# Patient Record
Sex: MALE | Race: ASIAN | Employment: STUDENT | URBAN - METROPOLITAN AREA
[De-identification: names, ages, dates, MRNs, and addresses within clinical notes are randomized per-mention and may not be internally consistent; named-entity substitution may affect disease eponyms.]

---
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
## 2019-04-30 PROBLEM — F39 MODERATE MOOD DISORDER (HCC): Status: ACTIVE | Noted: 2019-04-30

## 2019-04-30 PROBLEM — R63.6 UNDERWEIGHT IN ADOLESCENCE: Status: ACTIVE | Noted: 2018-08-31

## 2019-04-30 PROBLEM — F32.A DEPRESSION: Status: ACTIVE | Noted: 2019-04-30

## 2019-04-30 PROCEDURE — 80053 COMPREHEN METABOLIC PANEL: CPT | Performed by: FAMILY MEDICINE

## 2019-04-30 PROCEDURE — 84443 ASSAY THYROID STIM HORMONE: CPT | Performed by: FAMILY MEDICINE

## 2019-04-30 PROCEDURE — 85027 COMPLETE CBC AUTOMATED: CPT | Performed by: FAMILY MEDICINE

## 2019-04-30 NOTE — PROGRESS NOTES
CC:  Follow - Up (Pt presents to clinic for f/up and other issues-->parents did not specify. )      Hx of CC:  ACCOMPANIED BY BOTH PARENTS FOR VISIT. PATIENT C/O DECREASED APPETITE/ENERGY/SADNESS X MONTHS.   RESTARTED ON SERTRALINE 2 WEEKS AGO--BEING SEEN Future  - TSH W REFLEX TO FREE T4  - CBC, PLATELET; NO DIFFERENTIAL    None  Orders Placed This Encounter      Comp Metabolic Panel (14) [E]      CBC, Platelet, No Differential [E]          Standing Status: Future          Number of Occurrences: 1

## 2019-04-30 NOTE — PATIENT INSTRUCTIONS
Depression Affects Your Mind and Body     “When I was depressed, I felt awful. I was so tired all the time I could hardly think, but at night I couldn’t fall asleep. My head hurt. My stomach hurt.  I didn’t know what was wrong with me.”   Everyone feels s Depression is one of the most common mental health problems today. It is not just a state of unhappiness or sadness. It is a true disease. The cause seems to be related to a decrease in chemicals that transmit signals in the brain.  Having a family history · Take medicine as prescribed. · Tell each of your healthcare providers about all of the prescription and over-the-counter medicines, vitamins, and supplements you take. Certain supplements interact with medicines and can result in dangerous side effects.

## 2019-05-17 ENCOUNTER — HOSPITAL ENCOUNTER (EMERGENCY)
Facility: HOSPITAL | Age: 19
Discharge: HOME | End: 2019-05-17
Payer: MEDICAID

## 2019-05-17 VITALS
RESPIRATION RATE: 18 BRPM | DIASTOLIC BLOOD PRESSURE: 72 MMHG | SYSTOLIC BLOOD PRESSURE: 116 MMHG | OXYGEN SATURATION: 99 % | TEMPERATURE: 98 F | HEART RATE: 89 BPM

## 2019-05-17 DIAGNOSIS — S30.812A PENILE ABRASION, INITIAL ENCOUNTER: Primary | ICD-10-CM

## 2019-05-17 PROCEDURE — 81003 URINALYSIS AUTO W/O SCOPE: CPT | Performed by: NURSE PRACTITIONER

## 2019-05-17 PROCEDURE — 87591 N.GONORRHOEAE DNA AMP PROB: CPT | Performed by: NURSE PRACTITIONER

## 2019-05-17 PROCEDURE — 87491 CHLMYD TRACH DNA AMP PROBE: CPT | Performed by: NURSE PRACTITIONER

## 2019-05-17 PROCEDURE — 99283 EMERGENCY DEPT VISIT LOW MDM: CPT

## 2019-05-17 NOTE — ED PROVIDER NOTES
Patient Seen in: [facility] Emergency Department    History   CC: penile pain  HPI: Kat Martins 22year old male  who presents to the ER c/o penile pain x1wk. States he has been masturbating multiple times daily.  Pain increased w/ urination an Circumcised male  Skin - see  assessment, skin otherwise pink warm and dry throughout, mmm, cap refill <2seconds  Neuro - A&O x4, steady gait  MSK - makes purposeful movements of all extremities  Psych - Interactive and appropriate      ED Course     Lab

## 2019-05-17 NOTE — ED INITIAL ASSESSMENT (HPI)
Pt reports penis pains and dysuria x 1 week. Pt denies any recent unprotected sexual contact. Pt denies fevers.

## 2019-05-24 NOTE — PROGRESS NOTES
CC:  Follow - Up (Pt presents to clinic for f/up.)      Hx of CC:  ACCOMPANIED BY PARENTS FOR FOLLOW UP. MILDLY IMPROVED MOOD ON BUPROPION AND SERTRALINE. C/O SUPRAPUBIC PAIN AND MILD DYSURIA.       C/O INFREQUENT HARD BM'S AND LOW APPETITE    Vitals:

## 2019-06-24 NOTE — PROGRESS NOTES
CC:  Follow - Up (Pt presents to clinic for routine f/up.)      Hx of CC:  F/UP ON DEPRESSION/WEIGHT/APPETITE. ACCOMPANIED BY PARENTS.  SOMEWHAT BETTER MOOD (ABOUT 40% IMPROVED) & APPETITE. STILL SEEING PSYCHIATRY AT [facility].     Vitals:    06/24

## 2019-07-26 DIAGNOSIS — R63.6 UNDERWEIGHT IN ADOLESCENCE: ICD-10-CM

## 2019-07-29 RX ORDER — CYPROHEPTADINE HYDROCHLORIDE 4 MG/1
TABLET ORAL
Qty: 60 TABLET | Refills: 0 | Status: SHIPPED | OUTPATIENT
Start: 2019-07-29 | End: 2019-08-26 | Stop reason: ALTCHOICE

## 2019-08-26 PROBLEM — F20.5 RESIDUAL SCHIZOPHRENIA (HCC): Status: RESOLVED | Noted: 2017-06-05 | Resolved: 2019-08-26

## 2019-08-26 NOTE — PROGRESS NOTES
CC:  Follow - Up (Pt presents to clinic for routine medication f/up.)      Hx of CC:  ROUTINE FOLLOW UP ON DEPRESSION AND POOR APPETITE. ACCOMPANIED BY FATHER. MOOD MUCH BETTER, EATING MORE NOW.   JUST CAME BACK FROM A TRIP TO [place] AND WAS MORE SOCIAL

## 2019-09-24 RX ORDER — CIPROFLOXACIN 500 MG/1
TABLET, FILM COATED ORAL
Qty: 6 TABLET | Refills: 0 | OUTPATIENT
Start: 2019-09-24

## 2019-11-25 ENCOUNTER — OFFICE VISIT (OUTPATIENT)
Dept: INTERNAL MEDICINE CLINIC | Facility: CLINIC | Age: 19
End: 2019-11-25
Payer: MEDICAID

## 2019-11-25 VITALS
HEIGHT: 68.25 IN | BODY MASS INDEX: 20 KG/M2 | WEIGHT: 132 LBS | SYSTOLIC BLOOD PRESSURE: 130 MMHG | RESPIRATION RATE: 17 BRPM | OXYGEN SATURATION: 99 % | DIASTOLIC BLOOD PRESSURE: 82 MMHG | HEART RATE: 92 BPM

## 2019-11-25 DIAGNOSIS — F39 MODERATE MOOD DISORDER (HCC): ICD-10-CM

## 2019-11-25 DIAGNOSIS — B36.0 TINEA VERSICOLOR: ICD-10-CM

## 2019-11-25 DIAGNOSIS — Z23 NEED FOR INFLUENZA VACCINATION: ICD-10-CM

## 2019-11-25 DIAGNOSIS — Z02.5 ROUTINE SPORTS PHYSICAL EXAM: Primary | ICD-10-CM

## 2019-11-25 DIAGNOSIS — H52.13 MYOPIA OF BOTH EYES: ICD-10-CM

## 2019-11-25 PROBLEM — R63.6 UNDERWEIGHT: Status: RESOLVED | Noted: 2018-08-31 | Resolved: 2019-11-25

## 2019-11-25 PROBLEM — J31.0 RHINITIS: Status: RESOLVED | Noted: 2018-08-31 | Resolved: 2019-11-25

## 2019-11-25 PROCEDURE — 90686 IIV4 VACC NO PRSV 0.5 ML IM: CPT | Performed by: FAMILY MEDICINE

## 2019-11-25 PROCEDURE — 90471 IMMUNIZATION ADMIN: CPT | Performed by: FAMILY MEDICINE

## 2019-11-25 PROCEDURE — 99395 PREV VISIT EST AGE 18-39: CPT | Performed by: FAMILY MEDICINE

## 2019-11-25 RX ORDER — VILAZODONE HYDROCHLORIDE 20 MG/1
TABLET ORAL
Refills: 1 | COMMUNITY
Start: 2019-11-07

## 2019-11-25 RX ORDER — SELENIUM SULFIDE 2.5 MG/100ML
1 LOTION TOPICAL
Qty: 118 ML | Refills: 3 | Status: SHIPPED | OUTPATIENT
Start: 2019-11-25 | End: 2021-09-22 | Stop reason: ALTCHOICE

## 2019-11-25 NOTE — PROGRESS NOTES
Brandyn Hamilton is a 20year old male presents for a sports physical.  Pt denies any recent sports injury. Pt denies back pain. Pt denies any hx of exercise syncope. Pt denies any history of heart murmur.      WILL PLAY TENNIS    DOING WELL WITH DEPRESSION no cyanosis, clubbing or edema  NEURO: Oriented times three, cranial nerves are intact and motor and sensory are grossly intact    ASSESSMENT AND PLAN:  Emigdio Cornell is a 20year old male presents for a sports physical.  Pt is in good general health.  Pt

## 2020-02-13 ENCOUNTER — OFFICE VISIT (OUTPATIENT)
Dept: INTERNAL MEDICINE CLINIC | Facility: CLINIC | Age: 20
End: 2020-02-13
Payer: MEDICAID

## 2020-02-13 VITALS
HEART RATE: 73 BPM | DIASTOLIC BLOOD PRESSURE: 62 MMHG | BODY MASS INDEX: 20.16 KG/M2 | OXYGEN SATURATION: 98 % | WEIGHT: 133 LBS | HEIGHT: 68.25 IN | SYSTOLIC BLOOD PRESSURE: 108 MMHG

## 2020-02-13 DIAGNOSIS — R51.9 FREQUENT HEADACHES: Primary | ICD-10-CM

## 2020-02-13 DIAGNOSIS — F39 MODERATE MOOD DISORDER (HCC): ICD-10-CM

## 2020-02-13 DIAGNOSIS — F51.12 BEHAVIORALLY INDUCED INSUFFICIENT SLEEP SYNDROME: ICD-10-CM

## 2020-02-13 PROCEDURE — 99213 OFFICE O/P EST LOW 20 MIN: CPT | Performed by: FAMILY MEDICINE

## 2020-02-13 RX ORDER — TOPIRAMATE 50 MG/1
50 TABLET, FILM COATED ORAL NIGHTLY
Qty: 30 TABLET | Refills: 1 | Status: SHIPPED | OUTPATIENT
Start: 2020-02-13 | End: 2020-03-09 | Stop reason: SINTOL

## 2020-02-14 NOTE — PROGRESS NOTES
CC:  Headache (Pt presents to clinic for c/o more frequent headaches x 1 month. )      Hx of CC: C/O FREQUENT TEMPORAL HEADACHES (3-4X/WEEK) AND SLEEPINESS IN AM.  HAS MISSED SCHOOL AT TIMES DUE TO ABOVE.     TYPICALLY GOES TO SLEEP AT MIDNIGHT AND GETS UP

## 2020-03-09 NOTE — PROGRESS NOTES
CC:  Headache (Pt presents to clinic for f/up on headaches-->no relief. ) and Sleep Problem (Still not sleeping. New .)      Hx of CC:  HEADACHES GOT WORSE WITH TOPAMAX-->STOPPED IT. CURRENTLY GETS GENERALIZED HEADACHES ABOUT TWICE A WEEK.   NO SPECIFIC TR

## 2020-05-02 DIAGNOSIS — R51.9 FREQUENT HEADACHES: ICD-10-CM

## 2020-05-04 RX ORDER — IBUPROFEN 600 MG/1
TABLET ORAL
Qty: 30 TABLET | Refills: 1 | Status: SHIPPED | OUTPATIENT
Start: 2020-05-04 | End: 2020-07-14

## 2020-07-12 DIAGNOSIS — R51.9 FREQUENT HEADACHES: ICD-10-CM

## 2020-07-13 NOTE — TELEPHONE ENCOUNTER
Requested Prescriptions     Pending Prescriptions Disp Refills   • IBUPROFEN 600 MG Oral Tab [Pharmacy Med Name: IBUPROFEN 600MG TABLETS] 30 tablet 1     Sig: TAKE 1 TABLET(600 MG) BY MOUTH EVERY 8 HOURS AS NEEDED FOR PAIN     Last office visit: 3-9-2020

## 2020-07-14 RX ORDER — IBUPROFEN 600 MG/1
TABLET ORAL
Qty: 30 TABLET | Refills: 1 | Status: SHIPPED | OUTPATIENT
Start: 2020-07-14 | End: 2021-01-04

## 2020-09-01 ENCOUNTER — OFFICE VISIT (OUTPATIENT)
Dept: INTERNAL MEDICINE CLINIC | Facility: CLINIC | Age: 20
End: 2020-09-01
Payer: MEDICAID

## 2020-09-01 VITALS
WEIGHT: 126 LBS | OXYGEN SATURATION: 98 % | BODY MASS INDEX: 19.1 KG/M2 | HEIGHT: 68.25 IN | SYSTOLIC BLOOD PRESSURE: 112 MMHG | HEART RATE: 73 BPM | DIASTOLIC BLOOD PRESSURE: 74 MMHG

## 2020-09-01 DIAGNOSIS — L50.3 DERMATOGRAPHISM: Primary | ICD-10-CM

## 2020-09-01 DIAGNOSIS — R51.9 FREQUENT HEADACHES: ICD-10-CM

## 2020-09-01 DIAGNOSIS — T78.3XXA ANGIOEDEMA, INITIAL ENCOUNTER: ICD-10-CM

## 2020-09-01 DIAGNOSIS — F32.89 OTHER DEPRESSION: ICD-10-CM

## 2020-09-01 PROCEDURE — 3008F BODY MASS INDEX DOCD: CPT | Performed by: FAMILY MEDICINE

## 2020-09-01 PROCEDURE — 99214 OFFICE O/P EST MOD 30 MIN: CPT | Performed by: FAMILY MEDICINE

## 2020-09-01 PROCEDURE — 3074F SYST BP LT 130 MM HG: CPT | Performed by: FAMILY MEDICINE

## 2020-09-01 PROCEDURE — 3078F DIAST BP <80 MM HG: CPT | Performed by: FAMILY MEDICINE

## 2020-09-01 RX ORDER — LORATADINE 10 MG/1
10 TABLET ORAL DAILY PRN
Qty: 30 TABLET | Refills: 2 | Status: SHIPPED | OUTPATIENT
Start: 2020-09-01

## 2020-09-02 NOTE — PROGRESS NOTES
CC:  Medication Follow-Up (Pt presents to clinic for medication f/up.); Swelling (C/o swelling in finger joints. ); and Pain (C/o penile pain at night x wks.  )      Hx of CC:  F/UP ON DEPRESSION, MILD FINGER SWELLING,  CONCERNS, ETC.    Vitals:    09/01/ placed in this encounter.

## 2020-12-28 ENCOUNTER — OFFICE VISIT (OUTPATIENT)
Dept: INTERNAL MEDICINE CLINIC | Facility: CLINIC | Age: 20
End: 2020-12-28
Payer: MEDICAID

## 2020-12-28 VITALS
BODY MASS INDEX: 19 KG/M2 | DIASTOLIC BLOOD PRESSURE: 80 MMHG | HEIGHT: 68 IN | SYSTOLIC BLOOD PRESSURE: 120 MMHG | HEART RATE: 88 BPM | OXYGEN SATURATION: 99 %

## 2020-12-28 DIAGNOSIS — Z00.00 ANNUAL PHYSICAL EXAM: Primary | ICD-10-CM

## 2020-12-28 DIAGNOSIS — R51.9 FREQUENT HEADACHES: ICD-10-CM

## 2020-12-28 DIAGNOSIS — F32.89 OTHER DEPRESSION: ICD-10-CM

## 2020-12-28 DIAGNOSIS — Z23 NEED FOR VACCINATION: ICD-10-CM

## 2020-12-28 DIAGNOSIS — B36.0 TINEA VERSICOLOR: ICD-10-CM

## 2020-12-28 PROCEDURE — 3074F SYST BP LT 130 MM HG: CPT | Performed by: INTERNAL MEDICINE

## 2020-12-28 PROCEDURE — 90686 IIV4 VACC NO PRSV 0.5 ML IM: CPT | Performed by: INTERNAL MEDICINE

## 2020-12-28 PROCEDURE — 90472 IMMUNIZATION ADMIN EACH ADD: CPT | Performed by: INTERNAL MEDICINE

## 2020-12-28 PROCEDURE — 99395 PREV VISIT EST AGE 18-39: CPT | Performed by: INTERNAL MEDICINE

## 2020-12-28 PROCEDURE — 90651 9VHPV VACCINE 2/3 DOSE IM: CPT | Performed by: INTERNAL MEDICINE

## 2020-12-28 PROCEDURE — 3079F DIAST BP 80-89 MM HG: CPT | Performed by: INTERNAL MEDICINE

## 2020-12-28 PROCEDURE — 90471 IMMUNIZATION ADMIN: CPT | Performed by: INTERNAL MEDICINE

## 2020-12-28 RX ORDER — KETOCONAZOLE 20 MG/ML
1 SHAMPOO TOPICAL
Qty: 1 BOTTLE | Refills: 2 | Status: SHIPPED | OUTPATIENT
Start: 2020-12-28 | End: 2021-01-27

## 2020-12-28 RX ORDER — CLOTRIMAZOLE 1 %
1 CREAM (GRAM) TOPICAL 2 TIMES DAILY
Qty: 1 TUBE | Refills: 1 | Status: SHIPPED | OUTPATIENT
Start: 2020-12-28 | End: 2021-01-11

## 2020-12-28 NOTE — PROGRESS NOTES
Lamon Babinski is a 23year old male.     Chief complaint: annual physical exam     HPI:     Lamon Babinski is a 23year old male who presents for annual physical exam   Hx of depression and anxiety   Seeing a therapist / psychiatrist   In the last 6-8 mo HPI            EXAM:   /80   Pulse 88   Ht 5' 8\" (1.727 m)   SpO2 99%   BMI 19.16 kg/m²   GENERAL: well developed, well nourished,in no apparent distress  SKIN patches of skin discoloration on his abdomen   Reports that he used a lotion that was giv Ketoconazole 2 % External Shampoo; Apply 1 mL topically twice a week. Dispense: 1 Bottle; Refill: 2  - clotrimazole 1 % External Cream; Apply 1 Application topically 2 (two) times daily for 14 days.   Dispense: 1 Tube; Refill: 1  - TESTOSTERONE,FREE AND TO - CBC WITH DIFFERENTIAL WITH PLATELET; Future  - COMP METABOLIC PANEL (14); Future  - LIPID PANEL;  Future  - VITAMIN D, 25-HYDROXY; Future  - VITAMIN B12; Future  - HPV HUMAN PAPILLOMA VIRUS VACC 9 JONH 3 DOSE IM  - DERM - INTERNAL  - Ketoconazole 2 % Ext

## 2020-12-31 ENCOUNTER — NURSE ONLY (OUTPATIENT)
Dept: INTERNAL MEDICINE CLINIC | Facility: CLINIC | Age: 20
End: 2020-12-31
Payer: MEDICAID

## 2020-12-31 DIAGNOSIS — F32.89 OTHER DEPRESSION: ICD-10-CM

## 2020-12-31 DIAGNOSIS — Z23 NEED FOR VACCINATION: ICD-10-CM

## 2020-12-31 DIAGNOSIS — B36.0 TINEA VERSICOLOR: ICD-10-CM

## 2020-12-31 DIAGNOSIS — R51.9 FREQUENT HEADACHES: ICD-10-CM

## 2020-12-31 DIAGNOSIS — Z00.00 ANNUAL PHYSICAL EXAM: ICD-10-CM

## 2020-12-31 PROCEDURE — 82306 VITAMIN D 25 HYDROXY: CPT | Performed by: INTERNAL MEDICINE

## 2020-12-31 PROCEDURE — 36415 COLL VENOUS BLD VENIPUNCTURE: CPT | Performed by: INTERNAL MEDICINE

## 2020-12-31 PROCEDURE — 84403 ASSAY OF TOTAL TESTOSTERONE: CPT | Performed by: INTERNAL MEDICINE

## 2020-12-31 PROCEDURE — 85025 COMPLETE CBC W/AUTO DIFF WBC: CPT | Performed by: INTERNAL MEDICINE

## 2020-12-31 PROCEDURE — 82607 VITAMIN B-12: CPT | Performed by: INTERNAL MEDICINE

## 2020-12-31 PROCEDURE — 84402 ASSAY OF FREE TESTOSTERONE: CPT | Performed by: INTERNAL MEDICINE

## 2020-12-31 PROCEDURE — 80053 COMPREHEN METABOLIC PANEL: CPT | Performed by: INTERNAL MEDICINE

## 2020-12-31 PROCEDURE — 80061 LIPID PANEL: CPT | Performed by: INTERNAL MEDICINE

## 2020-12-31 NOTE — PROGRESS NOTES
TESTOST,VITAMIN B12, VITAMIN D, LIPID,CMP,and CBC labs drawn per Dr King Lower orders, Patient tolerated lab draw well

## 2021-01-01 LAB — 25(OH)D3 SERPL-MCNC: 22.6 NG/ML (ref 30–100)

## 2021-01-04 DIAGNOSIS — R51.9 FREQUENT HEADACHES: ICD-10-CM

## 2021-01-05 RX ORDER — IBUPROFEN 600 MG/1
600 TABLET ORAL EVERY 6 HOURS PRN
Qty: 30 TABLET | Refills: 1 | Status: SHIPPED | OUTPATIENT
Start: 2021-01-05 | End: 2021-09-22 | Stop reason: ALTCHOICE

## 2021-01-06 LAB
TESTOSTERONE, FREE, S: 26.6 NG/DL
TESTOSTERONE, TOTAL, S: 783 NG/DL

## 2021-01-21 RX ORDER — ERGOCALCIFEROL 1.25 MG/1
50000 CAPSULE ORAL WEEKLY
Qty: 12 CAPSULE | Refills: 1 | Status: SHIPPED | OUTPATIENT
Start: 2021-01-21 | End: 2021-04-09

## 2021-08-24 ENCOUNTER — TELEPHONE (OUTPATIENT)
Dept: INTERNAL MEDICINE CLINIC | Facility: CLINIC | Age: 21
End: 2021-08-24

## 2021-08-24 NOTE — TELEPHONE ENCOUNTER
Gage Cota is calling because her brother is feeling weak and is not eating much. Gage Cota mentioned he has had low grade fevers. He has an in office visit on September 22nd, but does Dr Janice Cooley want to do a virtual with him sooner?  Gage Cota also states that Pt i

## 2021-08-24 NOTE — TELEPHONE ENCOUNTER
LVM:  Dr. Paulino Reno requests he go to [facility] or [facility] and be tested for Covid and Strep. Even though he has been vaccinated for Covid, individuals are still getting sick with the variant. A phone or virtual visit will not help in this case.  If he has a temperat

## 2021-08-24 NOTE — TELEPHONE ENCOUNTER
Patient notified to go to [facility]. Says he will today.      Voiced understanding

## 2021-08-25 ENCOUNTER — OFFICE VISIT (OUTPATIENT)
Dept: FAMILY MEDICINE CLINIC | Facility: CLINIC | Age: 21
End: 2021-08-25
Payer: MEDICAID

## 2021-08-25 VITALS
HEIGHT: 68.5 IN | WEIGHT: 130.38 LBS | DIASTOLIC BLOOD PRESSURE: 70 MMHG | SYSTOLIC BLOOD PRESSURE: 110 MMHG | HEART RATE: 106 BPM | RESPIRATION RATE: 20 BRPM | OXYGEN SATURATION: 97 % | TEMPERATURE: 101 F | BODY MASS INDEX: 19.53 KG/M2

## 2021-08-25 DIAGNOSIS — J02.9 PHARYNGITIS, UNSPECIFIED ETIOLOGY: Primary | ICD-10-CM

## 2021-08-25 DIAGNOSIS — Z20.822 SUSPECTED COVID-19 VIRUS INFECTION: ICD-10-CM

## 2021-08-25 LAB
CONTROL LINE PRESENT WITH A CLEAR BACKGROUND (YES/NO): YES YES/NO
KIT LOT #: NORMAL NUMERIC
OPERATOR ID: NORMAL
RAPID SARS-COV-2 BY PCR: NOT DETECTED
STREP GRP A CUL-SCR: NEGATIVE

## 2021-08-25 PROCEDURE — U0002 COVID-19 LAB TEST NON-CDC: HCPCS | Performed by: NURSE PRACTITIONER

## 2021-08-25 PROCEDURE — 3074F SYST BP LT 130 MM HG: CPT | Performed by: NURSE PRACTITIONER

## 2021-08-25 PROCEDURE — 87880 STREP A ASSAY W/OPTIC: CPT | Performed by: NURSE PRACTITIONER

## 2021-08-25 PROCEDURE — 3008F BODY MASS INDEX DOCD: CPT | Performed by: NURSE PRACTITIONER

## 2021-08-25 PROCEDURE — 3078F DIAST BP <80 MM HG: CPT | Performed by: NURSE PRACTITIONER

## 2021-08-25 PROCEDURE — 99213 OFFICE O/P EST LOW 20 MIN: CPT | Performed by: NURSE PRACTITIONER

## 2021-08-25 NOTE — PROGRESS NOTES
CHIEF COMPLAINT:     Patient presents with:  Sore Throat: 2 days  Fever  Cough/URI      HPI:   Anna Shay is a 24year old male who presents with complaint of fever (102) yesterday. Slight cough and sore throat for (2) days.   Patient has been vaccinat mucosa pink and noninflamed  THROAT: oral mucosa pink, moist. No visible dental caries. Posterior pharynx is  slightly erythematous. No exudates. NECK: supple, non-tender  LUNGS: clear to auscultation bilaterally, no wheezes or rhonchi.  Breathing is non l

## 2021-08-26 ENCOUNTER — HOSPITAL ENCOUNTER (OUTPATIENT)
Age: 21
Discharge: HOME | End: 2021-08-26
Payer: MEDICAID

## 2021-08-26 VITALS
DIASTOLIC BLOOD PRESSURE: 85 MMHG | WEIGHT: 130 LBS | HEART RATE: 103 BPM | TEMPERATURE: 98 F | OXYGEN SATURATION: 99 % | RESPIRATION RATE: 17 BRPM | BODY MASS INDEX: 19 KG/M2 | SYSTOLIC BLOOD PRESSURE: 115 MMHG

## 2021-08-26 DIAGNOSIS — K08.89 PAIN, DENTAL: ICD-10-CM

## 2021-08-26 DIAGNOSIS — K12.0 CANKER SORES ORAL: Primary | ICD-10-CM

## 2021-08-26 PROCEDURE — 99202 OFFICE O/P NEW SF 15 MIN: CPT | Performed by: NURSE PRACTITIONER

## 2021-08-26 NOTE — TELEPHONE ENCOUNTER
Last night fever was 102. F. Give him Advil instead of tylenol. Not checking temperature each time before giving medication. Advised that Tylenol would be better for him. Kashif Lara states she can see that his mouth is somewhat swollen.   Asked her to be more

## 2021-08-26 NOTE — ED PROVIDER NOTES
Patient Seen in: [facility]      History   Patient presents with:  Fever    Stated Complaint: fever    HPI/Subjective:   HPI    19-year-old male arrives today with complaints of bilateral lower jaw pain, intermittent fevers which have been o lymphadenopathy. No stridor. Supple. No meningsmus. Heart: S1-S2. Regular rate and rhythm. Lungs: good inspiratory effort. +air entry bilaterally without wheezes, rhonchi, crackles. No accessory muscle use or tachypnea.        Abdomen: Soft, no [facility] [id number]  [phone]                Medications Prescribed:  Discharge Medication List as of 8/26/2021  4:01 PM

## 2021-08-26 NOTE — TELEPHONE ENCOUNTER
Mely Farias can you please help him to schedule an appt with any of the providers in the office today or tomorrow can look for [place] [address] too   Thanks,  MT

## 2021-08-26 NOTE — ED INITIAL ASSESSMENT (HPI)
Pt c/o fever, sore throat and B jaw pain x2-3 days. States neg covid and neg strep test yesterday. Tmax 102. Awake/alert. Breathing easy and even without distress. Speech clear. Skin warm, dry and pink.

## 2021-08-26 NOTE — TELEPHONE ENCOUNTER
Pt was tested in [facility] and he's negative for strep and negative for covid but he is having fevers that come and go, he's feeling weak and no appetite, he also has bumps under his tongue that are painful and swollen. Please advise on what they should do.  Jonny

## 2021-09-22 ENCOUNTER — OFFICE VISIT (OUTPATIENT)
Dept: INTERNAL MEDICINE CLINIC | Facility: CLINIC | Age: 21
End: 2021-09-22
Payer: MEDICAID

## 2021-09-22 VITALS
WEIGHT: 128 LBS | DIASTOLIC BLOOD PRESSURE: 72 MMHG | BODY MASS INDEX: 19.18 KG/M2 | SYSTOLIC BLOOD PRESSURE: 110 MMHG | OXYGEN SATURATION: 99 % | HEIGHT: 68.5 IN | HEART RATE: 76 BPM

## 2021-09-22 DIAGNOSIS — R50.9 FEVER, UNSPECIFIED FEVER CAUSE: ICD-10-CM

## 2021-09-22 DIAGNOSIS — F32.89 OTHER DEPRESSION: ICD-10-CM

## 2021-09-22 DIAGNOSIS — R11.2 NAUSEA AND VOMITING, INTRACTABILITY OF VOMITING NOT SPECIFIED, UNSPECIFIED VOMITING TYPE: Primary | ICD-10-CM

## 2021-09-22 PROCEDURE — 3074F SYST BP LT 130 MM HG: CPT | Performed by: FAMILY MEDICINE

## 2021-09-22 PROCEDURE — 3078F DIAST BP <80 MM HG: CPT | Performed by: FAMILY MEDICINE

## 2021-09-22 PROCEDURE — 99213 OFFICE O/P EST LOW 20 MIN: CPT | Performed by: FAMILY MEDICINE

## 2021-09-22 PROCEDURE — 3008F BODY MASS INDEX DOCD: CPT | Performed by: FAMILY MEDICINE

## 2021-09-22 NOTE — PATIENT INSTRUCTIONS
Please stay well hydrated. Advance your diet slowly. If you are not feeling well, you can consider taking tylenol. Try to avoid NSAIDs such as ibuprofen (advil) for now - this may make your stomach more upset.   Please go get a COVID test.  Please also normal...

## 2021-09-22 NOTE — ASSESSMENT & PLAN NOTE
Patient reports that he slowly came off of his thyroid medication and his headaches started to return because he has been feeling more depressed.   Spent significant amount of time discussing with him his sister how important it is for him to continue on wi

## 2021-09-22 NOTE — ASSESSMENT & PLAN NOTE
Fevers unclear etiology, could be viral in nature. Patient is nontoxic-appearing at this time. Recommend that he go get a Covid test.  Follow-up as needed.

## 2021-09-22 NOTE — ASSESSMENT & PLAN NOTE
Nausea and vomiting, unclear etiology. Could be gastroenteritis, could be related to mental health, could be also related to him coming off of his psychiatric medication. Overall it seems to be getting better at this time.   Recommend that he stay well-hy

## 2021-09-22 NOTE — PROGRESS NOTES
FAMILY MEDICINE CLINIC NOTE    HPI  Zahraa Brewer is a 24year old male presenting for     #fevers and vomiting  -HA intermittent for a few years; sees psychiatrist who prescribed vilazodone - not taking currently  -fever/chills - occurred 3 days ago one education: Not on file      Highest education level: Not on file    Occupational History      Not on file    Tobacco Use      Smoking status: Never Smoker      Smokeless tobacco: Never Used    Vaping Use      Vaping Use: Never used    Substance and Sexual FAMILY HISTORY  No family history on file. PHYSICAL EXAM   09/22/21  1437   BP: 110/72   Pulse: 76   SpO2: 99%   Weight: 128 lb (58.1 kg)   Height: 5' 8.5\" (1.74 m)      Body mass index is 19.18 kg/m².     GENERAL: NAD  HEENT: Moist mucous membranes, slowly came off of his thyroid medication and his headaches started to return because he has been feeling more depressed.   Spent significant amount of time discussing with him his sister how important it is for him to continue on with his antidepressant me

## 2021-12-31 NOTE — MR AVS SNAPSHOT
[address] at [address] [id number]  [phone]               Thank you for choosing us for your health care visit with Jessica Merlos DO.   We are glad to serve you and happy to provide you with this hannon It is common to feel that you are not ill and that you don't need treatment. It is important to accept the support of friends and family in continuing to take your medicine. Home care  · Ongoing care and support helps manage this disease.  Find a healthcar · Your symptoms are getting worse  · Family or friends express concern over your behavior and ask you to seek help  · Feeling out of control or that you are being controlled by others  · Feeling like you want to harm yourself or another  · Unable to care f factors are involved. For example, schizophrenia seems to run in families. The disorder may be triggered by traumatic events. Certain brain chemicals also play a role. And brain structure is different in people with schizophrenia.   How to find help  The fi It may occur when young people are still in school. They may not have learned certain life skills. And they might not have a chance to build careers or lasting relationships. What causes it? The causes of schizophrenia aren’t fully known.  It’s likely zoraida withdrawn. They may have little eye contact, and may not seem to respond. At other times, they might talk or act in strange ways. Who does it affect? Schizophrenia affects both men and women. It can strike people of all races, cultures, and incomes.  Sadl This list is accurate as of: 6/5/17 11:46 AM.  Always use your most recent med list.                Imiquimod 5 % Crea   Apply 1 Application topically 3 (three) times a week.            Salicylic Acid 40 % Pads   Apply 1 Application topically every other da o 3 servings of low-fat dairy a day  o 2 or less hours of screen time a day  o 1 or more hours of physical activity a day    To help children live healthy active lives, parents can:  o Be role models themselves by making healthy eating and daily physical a abnormal/expand...

## 2022-09-10 ENCOUNTER — OFFICE VISIT (OUTPATIENT)
Dept: INTERNAL MEDICINE CLINIC | Facility: CLINIC | Age: 22
End: 2022-09-10
Payer: MEDICAID

## 2022-09-10 VITALS
DIASTOLIC BLOOD PRESSURE: 72 MMHG | WEIGHT: 137.19 LBS | TEMPERATURE: 98 F | BODY MASS INDEX: 20.55 KG/M2 | HEIGHT: 68.5 IN | OXYGEN SATURATION: 99 % | HEART RATE: 88 BPM | SYSTOLIC BLOOD PRESSURE: 120 MMHG

## 2022-09-10 DIAGNOSIS — M54.2 NECK PAIN: ICD-10-CM

## 2022-09-10 DIAGNOSIS — L29.9 PRURITUS: ICD-10-CM

## 2022-09-10 DIAGNOSIS — R43.2 ALTERED TASTE: Primary | ICD-10-CM

## 2022-09-10 DIAGNOSIS — F32.89 OTHER DEPRESSION: ICD-10-CM

## 2022-09-10 DIAGNOSIS — L70.0 ACNE VULGARIS: ICD-10-CM

## 2022-09-10 PROBLEM — R50.9 FEVER: Status: RESOLVED | Noted: 2021-09-22 | Resolved: 2022-09-10

## 2022-09-10 PROBLEM — R11.2 NAUSEA AND VOMITING: Status: RESOLVED | Noted: 2021-09-22 | Resolved: 2022-09-10

## 2022-09-10 PROCEDURE — 3078F DIAST BP <80 MM HG: CPT | Performed by: FAMILY MEDICINE

## 2022-09-10 PROCEDURE — 3074F SYST BP LT 130 MM HG: CPT | Performed by: FAMILY MEDICINE

## 2022-09-10 PROCEDURE — 3008F BODY MASS INDEX DOCD: CPT | Performed by: FAMILY MEDICINE

## 2022-09-10 PROCEDURE — 99214 OFFICE O/P EST MOD 30 MIN: CPT | Performed by: FAMILY MEDICINE

## 2022-09-10 RX ORDER — FLUTICASONE PROPIONATE 50 MCG
2 SPRAY, SUSPENSION (ML) NASAL DAILY
Qty: 11.1 ML | Refills: 3 | Status: SHIPPED | OUTPATIENT
Start: 2022-09-10 | End: 2023-09-05

## 2022-09-10 NOTE — ASSESSMENT & PLAN NOTE
Patient with posterior head and neck pain, suspect musculoskeletal etiology. Does not seem like headaches at this time. Advised can use Advil as needed  Heating pad as needed. Monitor stress levels. Advise trying to switch out pillow to see if this helps at all. Stretches and exercises provided for neck spasms.   Follow-up as needed

## 2022-09-10 NOTE — ASSESSMENT & PLAN NOTE
Patient with altered taste for the past few months  We will start by trying Flonase nasal spray.   If without improvement, referral to ENT to further evaluate

## 2023-04-13 ENCOUNTER — OFFICE VISIT (OUTPATIENT)
Dept: INTERNAL MEDICINE CLINIC | Facility: CLINIC | Age: 23
End: 2023-04-13
Payer: MEDICAID

## 2023-04-13 VITALS
SYSTOLIC BLOOD PRESSURE: 106 MMHG | WEIGHT: 138 LBS | HEIGHT: 68.5 IN | DIASTOLIC BLOOD PRESSURE: 70 MMHG | OXYGEN SATURATION: 98 % | HEART RATE: 76 BPM | BODY MASS INDEX: 20.68 KG/M2

## 2023-04-13 DIAGNOSIS — H52.13 MYOPIA OF BOTH EYES: ICD-10-CM

## 2023-04-13 DIAGNOSIS — M54.2 NECK PAIN: ICD-10-CM

## 2023-04-13 DIAGNOSIS — F32.4 MAJOR DEPRESSIVE DISORDER WITH SINGLE EPISODE, IN PARTIAL REMISSION (HCC): ICD-10-CM

## 2023-04-13 DIAGNOSIS — Z00.00 HEALTH MAINTENANCE EXAMINATION: Primary | ICD-10-CM

## 2023-04-13 DIAGNOSIS — L70.0 ACNE VULGARIS: ICD-10-CM

## 2023-04-13 DIAGNOSIS — R43.2 ALTERED TASTE: ICD-10-CM

## 2023-04-13 DIAGNOSIS — R51.9 FREQUENT HEADACHES: ICD-10-CM

## 2023-04-13 DIAGNOSIS — L29.9 PRURITUS: ICD-10-CM

## 2023-04-13 PROBLEM — F32.9 MAJOR DEPRESSIVE DISORDER: Status: ACTIVE | Noted: 2019-04-30

## 2023-04-13 PROBLEM — F39 MODERATE MOOD DISORDER (HCC): Status: RESOLVED | Noted: 2019-04-30 | Resolved: 2023-04-13

## 2023-04-13 PROBLEM — F51.12 BEHAVIORALLY INDUCED INSUFFICIENT SLEEP SYNDROME: Status: RESOLVED | Noted: 2020-02-13 | Resolved: 2023-04-13

## 2023-04-13 PROBLEM — F51.04 PSYCHOPHYSIOLOGICAL INSOMNIA: Status: RESOLVED | Noted: 2017-06-05 | Resolved: 2023-04-13

## 2023-04-13 PROCEDURE — 3074F SYST BP LT 130 MM HG: CPT | Performed by: FAMILY MEDICINE

## 2023-04-13 PROCEDURE — 99395 PREV VISIT EST AGE 18-39: CPT | Performed by: FAMILY MEDICINE

## 2023-04-13 PROCEDURE — 3008F BODY MASS INDEX DOCD: CPT | Performed by: FAMILY MEDICINE

## 2023-04-13 PROCEDURE — 3078F DIAST BP <80 MM HG: CPT | Performed by: FAMILY MEDICINE

## 2023-04-13 PROCEDURE — 99214 OFFICE O/P EST MOD 30 MIN: CPT | Performed by: FAMILY MEDICINE

## 2023-04-13 RX ORDER — ADAPALENE 45 G/G
1 GEL TOPICAL NIGHTLY
Qty: 45 G | Refills: 0 | Status: SHIPPED | OUTPATIENT
Start: 2023-04-13

## 2023-04-13 RX ORDER — DOXYCYCLINE HYCLATE 100 MG
100 TABLET ORAL 2 TIMES DAILY
Qty: 60 TABLET | Refills: 0 | Status: SHIPPED | OUTPATIENT
Start: 2023-04-13

## 2023-04-13 RX ORDER — BENZOYL PEROXIDE 2.5 G/100G
1 GEL TOPICAL DAILY
Qty: 60 G | Refills: 0 | Status: SHIPPED | OUTPATIENT
Start: 2023-04-13

## 2023-04-14 NOTE — ASSESSMENT & PLAN NOTE
Patient continues to have altered taste for several months now. He reports no significant improvement with fluticasone nasal spray  We will refer to ENT to evaluate at this time.

## 2023-04-14 NOTE — ASSESSMENT & PLAN NOTE
Patient with acne pustules seen on the face as well as small amounts of acne throughout the back. We will start doxycycline 100 mg twice daily for 30 days. Also advised benzyl peroxide in the morning and adapalene at night.   Follow-up in 3 months to reassess

## 2023-04-14 NOTE — PATIENT INSTRUCTIONS
PATIENT INSTRUCTIONS    Thank you for seeing me today, it was a pleasure taking care of you. Please check out at the  and schedule a follow up appointment. Return in about 3 months (around 7/13/2023) for follow up. The following imaging studies were ordered: None  Please also follow up with the following specialists: ENT - for evaluation of your altered taste, psychiatry  Please fill out the advance directive information (power of  documents) and bring a copy to our clinic.   Acne - doxycycline 100 mg twice daily daily for 30 days  Benzoyl peroxide gel - use in the morning and wash off at night  Adapalene - use at night, wash off in the morning  HPV vaccine - return for a nursing visit to get this done       Best,   Dr. Lula Phillips

## 2023-04-14 NOTE — ASSESSMENT & PLAN NOTE
Patient reports mild intermittent pruritus of his back. Examination of the back only shows slight acne at this time. Advised regular use of moisturizer such as CeraVe. We will treat acne to see if there is any improvements.   If continue to bother him we will try a trial of hydroxyzine

## 2023-04-14 NOTE — ASSESSMENT & PLAN NOTE
Exercise and diet advised. HPV vaccine - can return for nursing visit when not fasting. Advanced directive information provided. Advised COVID vaccine booster.

## 2024-11-12 ENCOUNTER — OFFICE VISIT (OUTPATIENT)
Dept: INTERNAL MEDICINE CLINIC | Facility: CLINIC | Age: 24
End: 2024-11-12
Payer: MEDICAID

## 2024-11-12 VITALS
OXYGEN SATURATION: 98 % | SYSTOLIC BLOOD PRESSURE: 116 MMHG | HEIGHT: 68.5 IN | HEART RATE: 84 BPM | TEMPERATURE: 98 F | WEIGHT: 135 LBS | DIASTOLIC BLOOD PRESSURE: 70 MMHG | BODY MASS INDEX: 20.22 KG/M2

## 2024-11-12 DIAGNOSIS — F32.4 MAJOR DEPRESSIVE DISORDER WITH SINGLE EPISODE, IN PARTIAL REMISSION (HCC): ICD-10-CM

## 2024-11-12 DIAGNOSIS — L29.9 PRURITUS: ICD-10-CM

## 2024-11-12 DIAGNOSIS — R21 RASH: ICD-10-CM

## 2024-11-12 DIAGNOSIS — R43.2 ALTERED TASTE: ICD-10-CM

## 2024-11-12 DIAGNOSIS — L70.0 ACNE VULGARIS: ICD-10-CM

## 2024-11-12 DIAGNOSIS — H52.13 MYOPIA OF BOTH EYES: ICD-10-CM

## 2024-11-12 DIAGNOSIS — Z00.00 HEALTH MAINTENANCE EXAMINATION: Primary | ICD-10-CM

## 2024-11-12 PROBLEM — L50.3 DERMATOGRAPHISM: Status: RESOLVED | Noted: 2020-09-01 | Resolved: 2024-11-12

## 2024-11-12 LAB
ALBUMIN SERPL-MCNC: 4.8 G/DL (ref 3.2–4.8)
ALBUMIN/GLOB SERPL: 1.5 {RATIO} (ref 1–2)
ALP LIVER SERPL-CCNC: 68 U/L
ALT SERPL-CCNC: 45 U/L
ANION GAP SERPL CALC-SCNC: 6 MMOL/L (ref 0–18)
AST SERPL-CCNC: 26 U/L (ref ?–34)
BASOPHILS # BLD AUTO: 0.05 X10(3) UL (ref 0–0.2)
BASOPHILS NFR BLD AUTO: 1.1 %
BILIRUB SERPL-MCNC: 0.6 MG/DL (ref 0.3–1.2)
BUN BLD-MCNC: 11 MG/DL (ref 9–23)
BUN/CREAT SERPL: 10.9 (ref 10–20)
CALCIUM BLD-MCNC: 10.4 MG/DL (ref 8.7–10.4)
CHLORIDE SERPL-SCNC: 105 MMOL/L (ref 98–112)
CHOLEST SERPL-MCNC: 203 MG/DL (ref ?–200)
CO2 SERPL-SCNC: 28 MMOL/L (ref 21–32)
CREAT BLD-MCNC: 1.01 MG/DL
DEPRECATED RDW RBC AUTO: 37.1 FL (ref 35.1–46.3)
EGFRCR SERPLBLD CKD-EPI 2021: 106 ML/MIN/1.73M2 (ref 60–?)
EOSINOPHIL # BLD AUTO: 0.43 X10(3) UL (ref 0–0.7)
EOSINOPHIL NFR BLD AUTO: 9.6 %
ERYTHROCYTE [DISTWIDTH] IN BLOOD BY AUTOMATED COUNT: 12.2 % (ref 11–15)
FASTING PATIENT LIPID ANSWER: NO
FASTING STATUS PATIENT QL REPORTED: NO
GLOBULIN PLAS-MCNC: 3.2 G/DL (ref 2–3.5)
GLUCOSE BLD-MCNC: 71 MG/DL (ref 70–99)
HCT VFR BLD AUTO: 45.6 %
HDLC SERPL-MCNC: 42 MG/DL (ref 40–59)
HGB BLD-MCNC: 16.2 G/DL
IMM GRANULOCYTES # BLD AUTO: 0.01 X10(3) UL (ref 0–1)
IMM GRANULOCYTES NFR BLD: 0.2 %
LDLC SERPL CALC-MCNC: 113 MG/DL (ref ?–100)
LYMPHOCYTES # BLD AUTO: 1.75 X10(3) UL (ref 1–4)
LYMPHOCYTES NFR BLD AUTO: 39.1 %
MCH RBC QN AUTO: 29.3 PG (ref 26–34)
MCHC RBC AUTO-ENTMCNC: 35.5 G/DL (ref 31–37)
MCV RBC AUTO: 82.5 FL
MONOCYTES # BLD AUTO: 0.44 X10(3) UL (ref 0.1–1)
MONOCYTES NFR BLD AUTO: 9.8 %
NEUTROPHILS # BLD AUTO: 1.8 X10 (3) UL (ref 1.5–7.7)
NEUTROPHILS # BLD AUTO: 1.8 X10(3) UL (ref 1.5–7.7)
NEUTROPHILS NFR BLD AUTO: 40.2 %
NONHDLC SERPL-MCNC: 161 MG/DL (ref ?–130)
OSMOLALITY SERPL CALC.SUM OF ELEC: 286 MOSM/KG (ref 275–295)
PLATELET # BLD AUTO: 271 10(3)UL (ref 150–450)
POTASSIUM SERPL-SCNC: 4.7 MMOL/L (ref 3.5–5.1)
PROT SERPL-MCNC: 8 G/DL (ref 5.7–8.2)
RBC # BLD AUTO: 5.53 X10(6)UL
SODIUM SERPL-SCNC: 139 MMOL/L (ref 136–145)
TRIGL SERPL-MCNC: 274 MG/DL (ref 30–149)
TSI SER-ACNC: 2.36 UIU/ML (ref 0.55–4.78)
VLDLC SERPL CALC-MCNC: 48 MG/DL (ref 0–30)
WBC # BLD AUTO: 4.5 X10(3) UL (ref 4–11)

## 2024-11-12 PROCEDURE — 84443 ASSAY THYROID STIM HORMONE: CPT | Performed by: FAMILY MEDICINE

## 2024-11-12 PROCEDURE — 80053 COMPREHEN METABOLIC PANEL: CPT | Performed by: FAMILY MEDICINE

## 2024-11-12 PROCEDURE — 90715 TDAP VACCINE 7 YRS/> IM: CPT | Performed by: FAMILY MEDICINE

## 2024-11-12 PROCEDURE — 90656 IIV3 VACC NO PRSV 0.5 ML IM: CPT | Performed by: FAMILY MEDICINE

## 2024-11-12 PROCEDURE — 86803 HEPATITIS C AB TEST: CPT | Performed by: FAMILY MEDICINE

## 2024-11-12 PROCEDURE — 90472 IMMUNIZATION ADMIN EACH ADD: CPT | Performed by: FAMILY MEDICINE

## 2024-11-12 PROCEDURE — 90651 9VHPV VACCINE 2/3 DOSE IM: CPT | Performed by: FAMILY MEDICINE

## 2024-11-12 PROCEDURE — 87389 HIV-1 AG W/HIV-1&-2 AB AG IA: CPT | Performed by: FAMILY MEDICINE

## 2024-11-12 PROCEDURE — 90471 IMMUNIZATION ADMIN: CPT | Performed by: FAMILY MEDICINE

## 2024-11-12 PROCEDURE — 85025 COMPLETE CBC W/AUTO DIFF WBC: CPT | Performed by: FAMILY MEDICINE

## 2024-11-12 PROCEDURE — 99395 PREV VISIT EST AGE 18-39: CPT | Performed by: FAMILY MEDICINE

## 2024-11-12 PROCEDURE — 80061 LIPID PANEL: CPT | Performed by: FAMILY MEDICINE

## 2024-11-12 RX ORDER — ADAPALENE 45 G/G
1 GEL TOPICAL NIGHTLY
Qty: 45 G | Refills: 0 | Status: CANCELLED | OUTPATIENT
Start: 2024-11-12

## 2024-11-12 RX ORDER — DOXYCYCLINE HYCLATE 100 MG
100 TABLET ORAL 2 TIMES DAILY
Qty: 60 TABLET | Refills: 0 | Status: SHIPPED | OUTPATIENT
Start: 2024-11-12 | End: 2024-11-12

## 2024-11-12 RX ORDER — KETOCONAZOLE 20 MG/ML
1 SHAMPOO, SUSPENSION TOPICAL
Qty: 120 ML | Refills: 3 | Status: SHIPPED | OUTPATIENT
Start: 2024-11-14

## 2024-11-12 RX ORDER — BENZOYL PEROXIDE 2.5 G/100G
1 GEL TOPICAL DAILY
Qty: 60 G | Refills: 0 | Status: SHIPPED | OUTPATIENT
Start: 2024-11-12

## 2024-11-12 RX ORDER — BENZOYL PEROXIDE 2.5 G/100G
1 GEL TOPICAL DAILY
Qty: 60 G | Refills: 0 | Status: SHIPPED | OUTPATIENT
Start: 2024-11-12 | End: 2024-11-12

## 2024-11-12 RX ORDER — DOXYCYCLINE HYCLATE 100 MG
100 TABLET ORAL 2 TIMES DAILY
Qty: 60 TABLET | Refills: 0 | Status: SHIPPED | OUTPATIENT
Start: 2024-11-12

## 2024-11-12 RX ORDER — KETOCONAZOLE 20 MG/ML
1 SHAMPOO, SUSPENSION TOPICAL
Qty: 120 ML | Refills: 3 | Status: SHIPPED | OUTPATIENT
Start: 2024-11-14 | End: 2024-11-12

## 2024-11-12 NOTE — ASSESSMENT & PLAN NOTE
Patient with a pink rash seen on his right chest.  Suspect tinea versicolor  Start ketoconazole shampoo.

## 2024-11-12 NOTE — PROGRESS NOTES
FAMILY MEDICINE CLINIC NOTE    HPI  Jeovanny Bro is a 24 year old male presenting for physical    #Health Maintenance  -Diet: Ok. Eats fruits and vegetables and meat.   -Exercise: Walks around a lot at work. Not going to the gym   -Lung cancer screen: Not indicated  -Colon cancer screen: Not indicated  -Prostate cancer screen: Not indicated  -Aortic aneurysm screen: Not indicated  -Statin:  - 12/2020  -ASA: Not indicated  -HIV screen: Indicated   -Hep C screen: Indicated   -Gonorrhea/chlamydia:  Not indicated  -Syphillis: Not indicated  -TB: Not indicated  -Tobacco/alcohol: Per below  -Depression: PHQ-2 score of 1 (score >/= 3 do PHQ-9)  -Advanced Directive: Indicated     #Immunizations  -Tdap: 1/2014, Td 8/2014  -Flu shot: Indicated  -PCV13: Not indicated   -PCV20: Not indicated   -PPSV23: Not indicated   -HPV: Indicated  -RZV (preferred) or VZL: Not indicated   -RSV: Not indicated  -COVID: Moderna    #Skin   -pink rash  -R chest wall  -2 weeks  -states it gets very dry  -not itchy  -no pain  -no new soaps, lotions or detergents     #Occasional itching  -back  -sometimes  -uses cerave face wash and cream  -no soaps, lotions or detergent  -reports back is very sensitive      #Acne  -reports break outs  -face and back  -reports did not take doxycycline prescribed last time  -adaplene gel - not using   -benzoyl peroxide - not using    #MDD  -psychiatry - Dr Bernice Dixon  -viibryd - no longer on medication  -not on any medication  -depression is well managed    #Myopia  -wears glasses      #Altered taste----resolved  -tried flonase - helped  ----resolved    #Patient Care Team  Patient Care Team:  Kain Jarquin MD as PCP - General (Family Medicine)  Bernice Dixon (Mental Health)    ROS  GENERAL: No fever/chills, no recent weight loss   HEENT: No visual changes, no changes in hearing, no sore throats  NECK: No pain, no swelling  RESP: No cough, no SOB  CV: No chest pain, no palpitations  GI: No abd pain, no  N/V/D  MSK: No edema, no pain  SKIN: +rash  NEURO: No numbness, no tingling, no HA    HEALTH MAINTENANCE CHECKLIST  Health Maintenance Topics with due status: Overdue       Topic Date Due    HPV Vaccines 01/25/2021    Annual Depression Screening 01/01/2024    Annual Physical 04/13/2024    DTaP,Tdap,and Td Vaccines 08/07/2024    COVID-19 Vaccine 09/01/2024    Influenza Vaccine 10/01/2024       ALLERGIES  Allergies[1]    MEDICATIONS  Current Outpatient Medications   Medication Sig Dispense Refill    Benzoyl Peroxide 2.5 % External Gel Apply 1 Application topically daily. 60 g 0    Doxycycline Hyclate 100 MG Oral Tab Take 1 tablet (100 mg total) by mouth 2 (two) times daily. 60 tablet 0    [START ON 11/14/2024] ketoconazole 2 % External Shampoo Apply 1 Application topically twice a week. 120 mL 3    Adapalene 0.1 % External Gel Apply 1 g topically nightly. 45 g 0       ACTIVE PROBLEM  Patient Active Problem List   Diagnosis    Acne vulgaris    Major depressive disorder    Myopia of both eyes    Pruritus    Health maintenance examination    Rash       PAST MEDICAL HISTORY  Past Medical History:    Acne vulgaris    Major depressive disorder    Myopia of both eyes       PAST SOCIAL HISTORY  Social History     Socioeconomic History    Marital status: Single     Spouse name: Not on file    Number of children: Not on file    Years of education: Not on file    Highest education level: Not on file   Occupational History    Not on file   Tobacco Use    Smoking status: Never    Smokeless tobacco: Never   Vaping Use    Vaping status: Never Used   Substance and Sexual Activity    Alcohol use: No    Drug use: No    Sexual activity: Never   Other Topics Concern    Caffeine Concern Not Asked    Exercise Not Asked    Seat Belt Not Asked    Special Diet Not Asked    Stress Concern Not Asked    Weight Concern Not Asked   Social History Narrative    Relationships: Single    Children: None    Pets: Cat    School: [email or web address] - taking a break    Work: [employer]    Origin: Born in [place], came to [place] in [place].     Interests: Enjoys watching movies, hanging out with friends, playing video games.     Spiritual: Moravian      Social Drivers of Health     Financial Resource Strain: Not on file   Food Insecurity: Not on file   Transportation Needs: Not on file   Physical Activity: Not on file   Stress: Not on file   Social Connections: Not on file   Housing Stability: Not on file       PAST SURGICAL HISTORY  No past surgical history on file.    PAST FAMILY HISTORY  Family History   Problem Relation Age of Onset    Diabetes Mother     Hyperlipidemia Father     Diabetes Father     No Known Problems Sister     No Known Problems Brother     No Known Problems Maternal Grandmother     No Known Problems Maternal Grandfather     No Known Problems Paternal Grandmother     No Known Problems Paternal Grandfather     Colon Cancer Neg     Prostate Cancer Neg        PHYSICAL EXAM  Vitals:    11/12/24 1407   BP: 116/70   Pulse: 84   Temp: 98.4 °F (36.9 °C)   SpO2: 98%   Weight: 135 lb (61.2 kg)   Height: 5' 8.5\" (1.74 m)      Body mass index is 20.23 kg/m².    GENERAL: NAD  HEENT: Moist mucous membranes, no tonsillar swelling or erythema, PERRLA bilat, TM translucent and non-bulging  NECK: Supple, non-tender  RESP: CTAB, no wheezing, no rales, no rhonchi  CV: RRR, no murmurs  GI: Soft, non-distended, non-tender, no guarding, no rebound, no masses  MSK: No edema  SKIN: Warm and dry, light pink dry patch throughout the right chest wall/anterior shoulder. A few acne pustules on the face. A few scattered acne pustules and papules seen on the back  NEURO: Answering questions appropriately        LABS  Lab Results   Component Value Date    WBC 3.8 (L) 12/31/2020    HGB 15.1 12/31/2020    HCT 44.5 12/31/2020    .0 12/31/2020    NEPERCENT 30.0 12/31/2020    LYPERCENT 48.9 12/31/2020    MOPERCENT 9.2 12/31/2020    EOPERCENT 10.8 12/31/2020     BAPERCENT 0.8 12/31/2020    NE 1.14 (L) 12/31/2020    LYMABS 1.86 12/31/2020    MOABSO 0.35 12/31/2020    EOABSO 0.41 12/31/2020    BAABSO 0.03 12/31/2020       Lab Results   Component Value Date     12/31/2020    K 4.5 12/31/2020     12/31/2020    CO2 28.0 12/31/2020    ANIONGAP 3 12/31/2020    BUN 13 12/31/2020    CREATSERUM 1.17 12/31/2020    BUNCREA 11.1 12/31/2020    GLU 85 12/31/2020    CA 9.8 12/31/2020    OSMOCALC 285 12/31/2020    GFRNAA 89 12/31/2020    GFRAA 102 12/31/2020    ALT 32 12/31/2020    AST 19 12/31/2020    ALKPHO 61 12/31/2020    BILT 1.0 12/31/2020    TP 7.7 12/31/2020    ALB 4.2 12/31/2020    GLOBULIN 3.5 12/31/2020    ELECTAG 1.2 12/31/2020    FASTING Yes 12/31/2020    FASTING Yes 12/31/2020         Lab Results   Component Value Date    CHOLEST 168 12/31/2020    TRIG 165 (H) 12/31/2020    HDL 48 12/31/2020    LDL 87 12/31/2020    VLDL 33 (H) 12/31/2020    NONHDLC 120 12/31/2020        DIAGNOSTICS    ASSESSMENT/PLAN  Problem List Items Addressed This Visit          Neuro    RESOLVED: Altered taste     Resolved            Mental Health    Major depressive disorder     Depression is overall managed.  Follows with psychiatry.  No longer on Viibryd  He reports that he is doing well.             Eye    Myopia of both eyes     Wears glasses            Skin    Acne vulgaris     Patient with acne pustules seen on the face as well as small amounts of acne throughout the back.  We will start doxycycline 100 mg twice daily for 30 days.  Also advised benzyl peroxide in the morning and adapalene at night.  Will refer to dermatology as this remains bothersome to him.          Relevant Medications    Benzoyl Peroxide 2.5 % External Gel    Doxycycline Hyclate 100 MG Oral Tab    ketoconazole 2 % External Shampoo (Start on 11/14/2024)    Other Relevant Orders    DERM - INTERNAL    Pruritus     Patient reports mild intermittent pruritus and persistent sensitivity of his back.  Examination of the  back only shows slight acne at this time.  Advised regular use of moisturizer such as CeraVe.  Will refer to dermatology for further evaluation at this time.          Relevant Medications    Benzoyl Peroxide 2.5 % External Gel    Doxycycline Hyclate 100 MG Oral Tab    ketoconazole 2 % External Shampoo (Start on 11/14/2024)    Other Relevant Orders    DERM - INTERNAL    Rash     Patient with a pink rash seen on his right chest.  Suspect tinea versicolor  Start ketoconazole shampoo.         Relevant Medications    Benzoyl Peroxide 2.5 % External Gel    Doxycycline Hyclate 100 MG Oral Tab    ketoconazole 2 % External Shampoo (Start on 11/14/2024)       Health Encounters    Health maintenance examination - Primary     Exercise and diet advised.  CBC, CMP, lipid panel,TSH, HIV screen, hepatitis C screen  HPV vaccine today  Tdap vaccine today  Flu shot today.  COVID vaccine advised.  Advanced directive information provided.         Relevant Orders    CBC With Differential With Platelet    Comp Metabolic Panel (14)    HCV Antibody    Lipid Panel    HIV Ag/Ab Combo    TSH W Reflex To Free T4    HPV (Gardasil 9)    TETANUS, DIPHTHERIA TOXOIDS AND ACELLULAR PERTUSIS VACCINE (TDAP), >7 YEARS, IM USE    INFLUENZA VACCINE, TRI, PRESERV FREE, 0.5 ML       Return in about 1 year (around 11/12/2025) for physical.    Kain Jarquin MD  Family Medicine         [1] No Known Allergies

## 2024-11-12 NOTE — ASSESSMENT & PLAN NOTE
Patient reports mild intermittent pruritus and persistent sensitivity of his back.  Examination of the back only shows slight acne at this time.  Advised regular use of moisturizer such as CeraVe.  Will refer to dermatology for further evaluation at this time.

## 2024-11-12 NOTE — ASSESSMENT & PLAN NOTE
Patient with acne pustules seen on the face as well as small amounts of acne throughout the back.  We will start doxycycline 100 mg twice daily for 30 days.  Also advised benzyl peroxide in the morning and adapalene at night.  Will refer to dermatology as this remains bothersome to him.

## 2024-11-12 NOTE — ASSESSMENT & PLAN NOTE
Exercise and diet advised.  CBC, CMP, lipid panel,TSH, HIV screen, hepatitis C screen  HPV vaccine today  Tdap vaccine today  Flu shot today.  COVID vaccine advised.  Advanced directive information provided.

## 2024-11-12 NOTE — ASSESSMENT & PLAN NOTE
Depression is overall managed.  Follows with psychiatry.  No longer on Viibryd  He reports that he is doing well.

## 2024-11-13 PROBLEM — E78.5 HYPERLIPIDEMIA: Status: ACTIVE | Noted: 2024-11-13

## 2024-11-13 LAB — HCV AB SERPL QL IA: NONREACTIVE

## 2025-07-17 ENCOUNTER — APPOINTMENT (OUTPATIENT)
Dept: URBAN - METROPOLITAN AREA CLINIC 242 | Age: 25
Setting detail: DERMATOLOGY
End: 2025-07-17

## 2025-07-17 DIAGNOSIS — L70.0 ACNE VULGARIS: ICD-10-CM

## 2025-07-17 DIAGNOSIS — L21.8 OTHER SEBORRHEIC DERMATITIS: ICD-10-CM

## 2025-07-17 DIAGNOSIS — L64.8 OTHER ANDROGENIC ALOPECIA: ICD-10-CM

## 2025-07-17 DIAGNOSIS — L20.89 OTHER ATOPIC DERMATITIS: ICD-10-CM

## 2025-07-17 PROCEDURE — OTHER PRESCRIPTION: OTHER

## 2025-07-17 PROCEDURE — OTHER COUNSELING: OTHER

## 2025-07-17 PROCEDURE — OTHER MONITORING: OTHER

## 2025-07-17 PROCEDURE — OTHER MIPS QUALITY: OTHER

## 2025-07-17 RX ORDER — KETOCONAZOLE 20 MG/ML
SHAMPOO, SUSPENSION TOPICAL
Qty: 120 | Refills: 2 | Status: ERX | COMMUNITY
Start: 2025-07-17

## 2025-07-17 ASSESSMENT — LOCATION DETAILED DESCRIPTION DERM
LOCATION DETAILED: LEFT MEDIAL FRONTAL SCALP
LOCATION DETAILED: LEFT CENTRAL PARIETAL SCALP
LOCATION DETAILED: LEFT CENTRAL FRONTAL SCALP
LOCATION DETAILED: RIGHT CENTRAL PARIETAL SCALP
LOCATION DETAILED: RIGHT LATERAL UPPER BACK
LOCATION DETAILED: LEFT POSTERIOR SHOULDER
LOCATION DETAILED: LEFT MID-UPPER BACK
LOCATION DETAILED: RIGHT SUPERIOR PARIETAL SCALP

## 2025-07-17 ASSESSMENT — LOCATION SIMPLE DESCRIPTION DERM
LOCATION SIMPLE: LEFT SCALP
LOCATION SIMPLE: RIGHT UPPER BACK
LOCATION SIMPLE: LEFT UPPER BACK
LOCATION SIMPLE: SCALP
LOCATION SIMPLE: LEFT SHOULDER

## 2025-07-17 ASSESSMENT — LOCATION ZONE DERM
LOCATION ZONE: TRUNK
LOCATION ZONE: SCALP
LOCATION ZONE: ARM

## (undated) NOTE — ED AVS SNAPSHOT
Nidia Talbot   MRN: [id number]    Department:  [facility] Emergency Department   Date of Visit:  5/17/2019           Disclosure     Insurance plans vary and the physician(s) referred by the ER may not be covered by your plan.  Please contact CARE PHYSICIAN AT ONCE OR RETURN IMMEDIATELY TO THE EMERGENCY DEPARTMENT. If you have been prescribed any medication(s), please fill your prescription right away and begin taking the medication(s) as directed.   If you believe that any of the medications

## (undated) NOTE — LETTER
January 21, 2021    Lamon Babinski  2008 [address] [place] [address]      Dear Danette Roberts: The following are the results of your recent tests. Please review the list of test results.   Your result is the value on the left; we have also supplied the ra VITAMIN D, 25-HYDROXY   Result Value Ref Range    Vitamin D, 25OH, Total 22.6 (L) 30.0 - 100.0 ng/mL   VITAMIN B12   Result Value Ref Range    Vitamin B12 215 193 - 986 pg/mL   TESTOSTERONE,FREE AND TOTAL   Result Value Ref Range    Testosterone, Free, S 2

## (undated) NOTE — LETTER
Name:  Ariane Nieto Year:   Class: Student ID No.:   Address:   [address] [address] Phone:  [phone] (home)  :  20year old   Name Relationship Lgl Ctrlatha. Maritza 3 Work Phone Home Phone Mobile Phone   1.  [facility] * Brother HEART HEALTH QUESTIONS ABOUT YOUR FAMILY    15. Has any family member or relative  of heart problems or had an unexpected or unexplained sudden death before age 48? (including drowning, unexplained car accident, or sudden infant death syndrome)?     14. 31. Have you had infectious mono within the last month?    32. Do you have any rashes, pressure sores, or other skin problems? 35. Have you had a herpes or MRSA skin infection? 29. Have you ever had a head injury or concussion?     35. Have you ever h EXAMINATION   /82 (BP Location: Right arm, Patient Position: Sitting, Cuff Size: adult)   Pulse 92   Resp 17   Ht 68.25\"   Wt 132 lb (59.9 kg)   SpO2 99%   BMI 19.92 kg/m²  Normalized BMI data available only for age 0 to 21 years.  male    Vision: R [de-identified] Assistants or Advanced Nurse Practitioners to sign off on physicals.    [facility] Substance Testing Policy Consent to Random Testing   (This section for high school students only)   2272-3544 school term    As a prerequisite to participation in [place]

## (undated) NOTE — LETTER
11/14/2017              Margaret Mccall        2008 [address]  [id number]         I HAVE SEEN AND EXAMINED ABOVE PATIENT TODAY AT [facility] OFFICE. HE HAS BEEN MY PATIENT FOR OVER A YEAR.   JUANA MCKENNA HAS AUDITORY HALLUCINATIONS AND DIFFICU

## (undated) NOTE — LETTER
Date: 4/13/2023    Patient Name: Madhu Cabrera          To Whom it may concern: This letter has been written at the patient's request. The above patient was seen at the [facility] for treatment of a medical condition. This patient should be excused from attending work/school today for an office visit.         Sincerely,       Zachary Sesay MD

## (undated) NOTE — LETTER
3/9/2020              Urielferev Tian        [address]        [place] [address]         SEEN/EXAMINED TODAY AS FOLLOW UP.  MAY RETURN TO SCHOOL TOMORROW.       Sincerely,    Zohra Nick DO  [facility], [facility]

## (undated) NOTE — LETTER
January 21, 2021    Anna Shay  [address]      Dear Mookie Ortiz: The following are the results of your recent tests. Please review the list of test results.   Your result is the value on the left; we have also supplied the ra Vitamin D, 25OH, Total 22.6 (L) 30.0 - 100.0 ng/mL   VITAMIN B12   Result Value Ref Range    Vitamin B12 215 193 - 986 pg/mL   TESTOSTERONE,FREE AND TOTAL   Result Value Ref Range    Testosterone, Free, S 26.6 (H) 5.25 - 20.7 ng/dL    Testosterone, Total,

## (undated) NOTE — LETTER
2/13/2020              Ryan Sanchez        [address] [address]         ABOVE PATIENT WAS SEEN/EXAMINED TODAY AT [facility] OFFICE. HE IS HAVING FREQUENT TENSION HEADACHES AND IS NOT SLEEPING ENOUGH HOURS.   THIS HAS CAUSED HIM TO MISS